# Patient Record
Sex: MALE | Race: BLACK OR AFRICAN AMERICAN
[De-identification: names, ages, dates, MRNs, and addresses within clinical notes are randomized per-mention and may not be internally consistent; named-entity substitution may affect disease eponyms.]

---

## 2018-05-07 ENCOUNTER — HOSPITAL ENCOUNTER (EMERGENCY)
Dept: HOSPITAL 74 - FER | Age: 38
Discharge: HOME | End: 2018-05-07
Payer: COMMERCIAL

## 2018-05-07 VITALS — SYSTOLIC BLOOD PRESSURE: 140 MMHG | HEART RATE: 92 BPM | DIASTOLIC BLOOD PRESSURE: 93 MMHG

## 2018-05-07 VITALS — TEMPERATURE: 98.5 F

## 2018-05-07 VITALS — BODY MASS INDEX: 40.1 KG/M2

## 2018-05-07 DIAGNOSIS — R00.2: Primary | ICD-10-CM

## 2018-05-07 LAB
ALBUMIN SERPL-MCNC: 4.2 G/DL (ref 3.5–5)
ALP SERPL-CCNC: 57 U/L (ref 32–92)
ALT SERPL-CCNC: 41 U/L (ref 10–40)
ANION GAP SERPL CALC-SCNC: 8 MMOL/L (ref 8–16)
AST SERPL-CCNC: 33 U/L (ref 10–42)
BASOPHILS # BLD: 2.7 % (ref 0–2)
BILIRUB SERPL-MCNC: 0.5 MG/DL (ref 0.2–1)
BUN SERPL-MCNC: 11 MG/DL (ref 7–18)
CALCIUM SERPL-MCNC: 8.8 MG/DL (ref 8.4–10.2)
CHLORIDE SERPL-SCNC: 102 MMOL/L (ref 98–107)
CO2 SERPL-SCNC: 25 MMOL/L (ref 22–28)
CREAT SERPL-MCNC: 1 MG/DL (ref 0.6–1.3)
DEPRECATED RDW RBC AUTO: 13.2 % (ref 11.9–15.9)
EOSINOPHIL # BLD: 2.4 % (ref 0–4.5)
GLUCOSE SERPL-MCNC: 123 MG/DL (ref 74–106)
HCT VFR BLD CALC: 42.6 % (ref 35.4–49)
HGB BLD-MCNC: 14.5 GM/DL (ref 11.7–16.9)
LYMPHOCYTES # BLD: 23.6 % (ref 8–40)
MCH RBC QN AUTO: 28 PG (ref 25.7–33.7)
MCHC RBC AUTO-ENTMCNC: 34.1 G/DL (ref 32–35.9)
MCV RBC: 82.2 FL (ref 80–96)
MONOCYTES # BLD AUTO: 6.3 % (ref 3.8–10.2)
NEUTROPHILS # BLD: 65 % (ref 42.8–82.8)
PLATELET # BLD AUTO: 361 K/MM3 (ref 134–434)
PMV BLD: 8.2 FL (ref 7.5–11.1)
POTASSIUM SERPLBLD-SCNC: 3.8 MMOL/L (ref 3.5–5.1)
PROT SERPL-MCNC: 7.3 G/DL (ref 6.4–8.3)
RBC # BLD AUTO: 5.18 M/MM3 (ref 4–5.6)
SODIUM SERPL-SCNC: 135 MMOL/L (ref 136–145)
WBC # BLD AUTO: 5.2 K/MM3 (ref 4–10.8)

## 2018-05-07 NOTE — PDOC
History of Present Illness





- General


Chief Complaint: Palpitations


Stated Complaint: PALPITATIONS


Time Seen by Provider: 05/07/18 10:29


History Source: Patient


Exam Limitations: No Limitations





- History of Present Illness


Initial Comments: 





05/07/18 11:00





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


Pt reported that he felt well this morning. Was driving into work when he felt 

sudden onset of rapid palpitations while driving.


Subsequently, felt sharp stabby type of chest discomfort but no SOB.


No n/v/diaphoresis.


Resolved after approximately 40 minutes.


This occurred earlier this morning.


Pt reports that he's been having these symptoms for the last several weeks.


Some weeks, it would occur several times a day, some other weeks where would 

occur one day and not on other days.


Would have similar type of chest discomfort.


Currently denies symptoms. Never visited a physician for this. 


When patient was a child, was on atenolol for an "arrhythmia" which patient 

does not recall what it was.


Not currently on medications.


Denies hx of PEs, unilateral calf swelling, DVT, recent surgery, cancer, or 

hemoptysis.





Past History





- Past Medical History


Allergies/Adverse Reactions: 


 Allergies











Allergy/AdvReac Type Severity Reaction Status Date / Time


 


No Known Allergies Allergy   Verified 05/07/18 10:32











Home Medications: 


Ambulatory Orders





NK [No Known Home Medication]  05/07/18 








Cardiac Disorders: Yes (Arrythmia)


COPD: No


Other medical history: SHOULDER DISLOCATION





- Suicide/Smoking/Psychosocial Hx


Smoking Status: No


Smoking History: Never smoked


Have you smoked in the past 12 months: No


Number of Cigarettes Smoked Daily: 0


Hx Alcohol Use: No


Drug/Substance Use Hx: No


Substance Use Type: None





**Review of Systems





- Review of Systems


Able to Perform ROS?: Yes


Comments:: 





05/07/18 11:02





GENERAL/CONSTITUTIONAL: No fever, weakness. 


HEAD, EYES, EARS, NOSE AND THROAT: No change in vision. No ear pain or 

discharge. No sore throat.


CARDIOVASCULAR: +palpitations, chest discomfort


RESPIRATORY: No cough, wheezing, or hemoptysis.


GASTROINTESTINAL: No abdominal pain, nausea, vomiting, diarrhea, or decreased 

PO intolerance. 


GENITOURINARY: No dysuria, frequency, or change in urination.


MUSCULOSKELETAL: No joint or muscle swelling or pain. No neck or back pain.


SKIN: No rash


NEUROLOGIC: No headache, vertigo, loss of consciousness, or change in strength/

sensation.


ENDOCRINE: No increased thirst. No abnormal weight change.


HEMATOLOGIC/LYMPHATIC: No anemia, easy bleeding, or history of blood clots.


ALLERGIC/IMMUNOLOGIC: No hives or skin allergy. 





*Physical Exam





- Vital Signs


 Last Vital Signs











Temp Pulse Resp BP Pulse Ox


 


 98.5 F   91 H  17   120/90   96 


 


 05/07/18 10:28  05/07/18 10:52  05/07/18 10:52  05/07/18 10:52  05/07/18 10:52














- Physical Exam


Comments: 





05/07/18 11:02





GENERAL: Awake, alert, and fully oriented, in no acute distress. 


HEAD: No signs of trauma


EYES: PERRLA, EOMI, sclera anicteric, conjunctiva clear


ENT: Auricles normal inspection, hearing grossly normal, nares patent


NECK: Normal ROM, supple


LUNGS: Breath sounds equal, clear to auscultation bilaterally.  No wheezes, and 

no crackles


HEART: Regular rate and rhythm, normal S1 and S2, no murmurs, rubs or gallops


ABDOMEN: Soft, nontender.  No guarding, no rebound.  No masses


EXTREMITIES: Normal range of motion, no edema.  No clubbing or cyanosis. No 

cords, erythema, or tenderness


NEUROLOGICAL: Cranial nerves II through XII grossly intact.  Normal speech, 

normal gait


SKIN: Warm, Dry, normal turgor, no rashes or lesions noted.





**Heart Score/ECG Review


  ** #1


ECG reviewed & interpreted by me at: 10:35





05/07/18 11:04


NSR 94, no std/miesha, TWI III, normal axis, normal intervals,  msec. no 

brugada, no HOCM, no WPW





ED Treatment Course





- LABORATORY


CBC & Chemistry Diagram: 


 05/07/18 10:44





 05/07/18 10:44





- RADIOLOGY


Radiology Studies Ordered: 














 Category Date Time Status


 


 CHEST PA & LAT [RAD] Stat Radiology  05/07/18 10:44 Ordered














Medical Decision Making





- Medical Decision Making





05/07/18 11:03





 Vital Signs











Temp Pulse Resp BP Pulse Ox


 


 98.5 F   91 H  17   120/90   96 


 


 05/07/18 10:28  05/07/18 10:52  05/07/18 10:52  05/07/18 10:52  05/07/18 10:52








Pt is currently asymptomatic. Has been having this for several weeks.


Will need to place patient on cardiac monitor to evaluate for arrhythmias such 

as SVT, atrial fibrillation.


ECG is reassuring with no acute findings.


Will send labs including troponin, though the history does not seem to suggest 

ACS as the primary cause.


Once results return, will consult cardiology for disposition.


05/07/18 12:32





Labs and chest xray reviewed. No acute findings.


Case discussed with Dr. Danilo Almaraz. Requests we add on TSH.


Pt can follow up with him as an outpatient with holter and echo.


Pt reports feeling asymptomatic. Reports of the copy given to him.


Pt will follow up as an outpatient.


Return precautions given.





I discussed the physical exam findings, ancillary test results and final 

diagnoses with the patient.  I answered all of the patient's questions.  The 

patient was satisfied with the care received and felt comfortable with the 

discharge plan and treatment plan.  The patient will call their primary care 

physician within 24 hours to arrange follow-up and will return to the Emergency 

Department with any new, persistant or worsening symptoms. 





*DC/Admit/Observation/Transfer


Diagnosis at time of Disposition: 


 Palpitations








- Discharge Dispostion


Disposition: HOME


Condition at time of disposition: Good


Admit: No





- Referrals


Referrals: 


Danilo Almaraz MD [Staff Physician] - 





- Patient Instructions


Printed Discharge Instructions:  [facility] for Palpitations


Additional Instructions: 


Please call to schedule an appointment.


They are expecting you as their patient.


They are part of the [facility] network.


If you have worsening chest pain or shortness of breath, please call your 

doctor or return to the ER.








- Post Discharge Activity

## 2018-05-09 NOTE — EKG
Test Reason : 

Blood Pressure : ***/*** mmHG

Vent. Rate : 094 BPM     Atrial Rate : 094 BPM

   P-R Int : 156 ms          QRS Dur : 100 ms

    QT Int : 354 ms       P-R-T Axes : 042 041 012 degrees

   QTc Int : 442 ms

 

NORMAL SINUS RHYTHM

NORMAL ECG

NO PREVIOUS ECGS AVAILABLE

Confirmed by KATRINA MEZA MD ([id number]) on 5/9/2018 1:04:06 PM

 

Referred By:  WILLIE           Confirmed By:KATRINA MEZA MD